# Patient Record
Sex: MALE | Race: WHITE | URBAN - METROPOLITAN AREA
[De-identification: names, ages, dates, MRNs, and addresses within clinical notes are randomized per-mention and may not be internally consistent; named-entity substitution may affect disease eponyms.]

---

## 2024-04-23 ENCOUNTER — OFFICE VISIT (OUTPATIENT)
Dept: PRIMARY CARE | Facility: CLINIC | Age: 39
End: 2024-04-23
Payer: COMMERCIAL

## 2024-04-23 VITALS
DIASTOLIC BLOOD PRESSURE: 77 MMHG | HEIGHT: 74 IN | TEMPERATURE: 98.3 F | HEART RATE: 69 BPM | SYSTOLIC BLOOD PRESSURE: 114 MMHG

## 2024-04-23 DIAGNOSIS — Z13.6 SCREENING FOR CARDIOVASCULAR CONDITION: ICD-10-CM

## 2024-04-23 DIAGNOSIS — F95.2 TOURETTE'S: ICD-10-CM

## 2024-04-23 DIAGNOSIS — Z76.89 ENCOUNTER TO ESTABLISH CARE: Primary | ICD-10-CM

## 2024-04-23 DIAGNOSIS — K21.9 CHRONIC GERD: ICD-10-CM

## 2024-04-23 DIAGNOSIS — Z13.21 ENCOUNTER FOR VITAMIN DEFICIENCY SCREENING: ICD-10-CM

## 2024-04-23 DIAGNOSIS — E78.2 MIXED HYPERLIPIDEMIA: ICD-10-CM

## 2024-04-23 DIAGNOSIS — R25.2 MUSCLE CRAMP: ICD-10-CM

## 2024-04-23 DIAGNOSIS — Z13.1 SCREENING FOR DIABETES MELLITUS: ICD-10-CM

## 2024-04-23 PROBLEM — E78.5 HYPERLIPIDEMIA: Status: ACTIVE | Noted: 2024-04-23

## 2024-04-23 PROCEDURE — 1036F TOBACCO NON-USER: CPT | Performed by: INTERNAL MEDICINE

## 2024-04-23 PROCEDURE — 99214 OFFICE O/P EST MOD 30 MIN: CPT | Performed by: INTERNAL MEDICINE

## 2024-04-23 RX ORDER — PANTOPRAZOLE SODIUM 40 MG/1
40 TABLET, DELAYED RELEASE ORAL
Qty: 90 TABLET | Refills: 1 | Status: SHIPPED | OUTPATIENT
Start: 2024-04-23 | End: 2024-10-20

## 2024-04-23 RX ORDER — LAMOTRIGINE 150 MG/1
1 TABLET ORAL 2 TIMES DAILY
COMMUNITY
Start: 2024-04-21

## 2024-04-23 ASSESSMENT — PAIN SCALES - GENERAL: PAINLEVEL: 4

## 2024-04-23 ASSESSMENT — ENCOUNTER SYMPTOMS
POLYDIPSIA: 0
FEVER: 0
COUGH: 0
PALPITATIONS: 0
CHILLS: 0
SHORTNESS OF BREATH: 0
NECK PAIN: 1
BACK PAIN: 1

## 2024-04-23 NOTE — PATIENT INSTRUCTIONS
Do not start the Pantoprazole for the reflux and stomach until you complete the H pylori breath test.

## 2024-04-23 NOTE — PROGRESS NOTES
Subjective   Patient ID: Ruben Chase is a 38 y.o. male who presents for Establish Care, Neck Pain, and Back Pain.    38-year-old male presents today to [facility] for care.  He has not been working with primary care doctor in a number of years but he does have a psychiatrist he follows with for the diagnosis of Tourette's syndrome.  He has primarily motor tics involving his eyes and throat clearing which have been reasonably well-controlled through Lamictal but he will still have breakthroughs especially under stress.  The patient is interested in establishing with a new psychiatrist because he is wants to consider if there is other medication or treatment options available to him and also his current psychiatrist is winding down their practice and he will eventually need a new one no matter what.    He has a history of chronic uncontrolled GERD for multiple years frequent symptoms he notices a strong relationship between certain types of foods that he eats although it is wide across the spectrum.  He claims that foods containing gluten will provoke his reflux and acid indigestion as well pepperoni spicy foods possibly coffee.  He has a regular occurrence she reports daily acid reflux.  He is using Tums and Pepcid consistently to manage the symptoms and even the combination of those is not consistently successful.  He has never been previously seen by a gastroenterologist, had an endoscopy, or been screened or tested for H. pylori.  He denies nausea vomiting or abdominal pain associated with the symptoms.  He has not been losing weight unintentionally.  He has no known family history of gastro intestinal cancers.    He has a healthy weight, healthy blood pressure, exercises regularly both with martial arts and [employer].        Neck Pain   Pertinent negatives include no chest pain or fever.   Back Pain  Pertinent negatives include no chest pain or fever.        Review of Systems   Constitutional:  Negative for  "chills and fever.   Respiratory:  Negative for cough and shortness of breath.    Cardiovascular:  Negative for chest pain and palpitations.   Endocrine: Negative for polydipsia and polyuria.   Musculoskeletal:  Positive for back pain and neck pain.       Objective   /77 (BP Location: Right arm, Patient Position: Sitting, BP Cuff Size: Large adult)   Pulse 69   Temp 36.8 °C (98.3 °F) (Temporal)   Ht 1.88 m (6' 2\")     Physical Exam  Constitutional:       Appearance: Normal appearance.   HENT:      Head: Normocephalic and atraumatic.      Right Ear: Tympanic membrane normal.      Left Ear: Tympanic membrane normal.      Nose: Nose normal.      Mouth/Throat:      Mouth: Mucous membranes are moist.   Eyes:      Extraocular Movements: Extraocular movements intact.      Conjunctiva/sclera: Conjunctivae normal.      Pupils: Pupils are equal, round, and reactive to light.   Neck:      Thyroid: No thyroid mass, thyromegaly or thyroid tenderness.      Vascular: No carotid bruit.   Cardiovascular:      Rate and Rhythm: Normal rate and regular rhythm.      Heart sounds: No murmur heard.     No friction rub. No gallop.   Pulmonary:      Effort: Pulmonary effort is normal. No respiratory distress.      Breath sounds: No wheezing, rhonchi or rales.   Abdominal:      General: Bowel sounds are normal.      Palpations: Abdomen is soft.      Tenderness: There is no abdominal tenderness. There is no guarding.      Hernia: No hernia is present.   Musculoskeletal:      Cervical back: Neck supple. No tenderness.      Right lower leg: No edema.      Left lower leg: No edema.   Lymphadenopathy:      Cervical: No cervical adenopathy.   Skin:     Coloration: Skin is not jaundiced.   Neurological:      General: No focal deficit present.      Mental Status: He is alert and oriented to person, place, and time.   Psychiatric:         Mood and Affect: Mood normal.         Assessment/Plan   Problem List Items Addressed This Visit           "   ICD-10-CM    Hyperlipidemia E78.5    Relevant Orders    Lipid Panel    CBC    Comprehensive Metabolic Panel    Hemoglobin A1C    Magnesium    Vitamin D 25-Hydroxy,Total (for eval of Vitamin D levels)    Referral to Gastroenterology    Tourette's F95.2    Relevant Orders    Referral to Psychiatry    Chronic GERD K21.9    Relevant Medications    pantoprazole (ProtoNix) 40 mg EC tablet    Other Relevant Orders    H. Pylori Breath Test     Other Visit Diagnoses         Codes    Encounter to [facility] care    -  Primary Z76.89    Screening for diabetes mellitus     Z13.1    Relevant Orders    Lipid Panel    CBC    Comprehensive Metabolic Panel    Hemoglobin A1C    Magnesium    Vitamin D 25-Hydroxy,Total (for eval of Vitamin D levels)    Referral to Gastroenterology    Screening for cardiovascular condition     Z13.6    Relevant Orders    Lipid Panel    CBC    Comprehensive Metabolic Panel    Hemoglobin A1C    Magnesium    Vitamin D 25-Hydroxy,Total (for eval of Vitamin D levels)    Referral to Gastroenterology    Muscle cramp     R25.2    Relevant Orders    Lipid Panel    CBC    Comprehensive Metabolic Panel    Hemoglobin A1C    Magnesium    Vitamin D 25-Hydroxy,Total (for eval of Vitamin D levels)    Referral to Gastroenterology    Encounter for vitamin deficiency screening     Z13.21    Relevant Orders    Lipid Panel    CBC    Comprehensive Metabolic Panel    Hemoglobin A1C    Magnesium    Vitamin D 25-Hydroxy,Total (for eval of Vitamin D levels)    Referral to Gastroenterology

## 2024-05-04 ENCOUNTER — LAB (OUTPATIENT)
Dept: LAB | Facility: LAB | Age: 39
End: 2024-05-04
Payer: COMMERCIAL

## 2024-05-04 DIAGNOSIS — Z13.1 SCREENING FOR DIABETES MELLITUS: ICD-10-CM

## 2024-05-04 DIAGNOSIS — Z13.21 ENCOUNTER FOR VITAMIN DEFICIENCY SCREENING: ICD-10-CM

## 2024-05-04 DIAGNOSIS — K21.9 CHRONIC GERD: ICD-10-CM

## 2024-05-04 DIAGNOSIS — E78.2 MIXED HYPERLIPIDEMIA: ICD-10-CM

## 2024-05-04 DIAGNOSIS — R25.2 MUSCLE CRAMP: ICD-10-CM

## 2024-05-04 DIAGNOSIS — Z13.6 SCREENING FOR CARDIOVASCULAR CONDITION: ICD-10-CM

## 2024-05-04 LAB
25(OH)D3 SERPL-MCNC: 37 NG/ML (ref 30–100)
ALBUMIN SERPL BCP-MCNC: 5 G/DL (ref 3.4–5)
ALP SERPL-CCNC: 61 U/L (ref 33–120)
ALT SERPL W P-5'-P-CCNC: 13 U/L (ref 10–52)
ANION GAP SERPL CALC-SCNC: 14 MMOL/L (ref 10–20)
AST SERPL W P-5'-P-CCNC: 16 U/L (ref 9–39)
BILIRUB SERPL-MCNC: 0.9 MG/DL (ref 0–1.2)
BUN SERPL-MCNC: 12 MG/DL (ref 6–23)
CALCIUM SERPL-MCNC: 9.7 MG/DL (ref 8.6–10.6)
CHLORIDE SERPL-SCNC: 104 MMOL/L (ref 98–107)
CHOLEST SERPL-MCNC: 182 MG/DL (ref 0–199)
CHOLESTEROL/HDL RATIO: 3.3
CO2 SERPL-SCNC: 25 MMOL/L (ref 21–32)
CREAT SERPL-MCNC: 1.04 MG/DL (ref 0.5–1.3)
EGFRCR SERPLBLD CKD-EPI 2021: >90 ML/MIN/1.73M*2
ERYTHROCYTE [DISTWIDTH] IN BLOOD BY AUTOMATED COUNT: 11.9 % (ref 11.5–14.5)
EST. AVERAGE GLUCOSE BLD GHB EST-MCNC: 88 MG/DL
GLUCOSE SERPL-MCNC: 84 MG/DL (ref 74–99)
HBA1C MFR BLD: 4.7 %
HCT VFR BLD AUTO: 45.3 % (ref 41–52)
HDLC SERPL-MCNC: 55.2 MG/DL
HGB BLD-MCNC: 16.2 G/DL (ref 13.5–17.5)
LDLC SERPL CALC-MCNC: 114 MG/DL
MAGNESIUM SERPL-MCNC: 2.24 MG/DL (ref 1.6–2.4)
MCH RBC QN AUTO: 30.6 PG (ref 26–34)
MCHC RBC AUTO-ENTMCNC: 35.8 G/DL (ref 32–36)
MCV RBC AUTO: 86 FL (ref 80–100)
NON HDL CHOLESTEROL: 127 MG/DL (ref 0–149)
NRBC BLD-RTO: 0 /100 WBCS (ref 0–0)
PLATELET # BLD AUTO: 273 X10*3/UL (ref 150–450)
POTASSIUM SERPL-SCNC: 4.1 MMOL/L (ref 3.5–5.3)
PROT SERPL-MCNC: 7.4 G/DL (ref 6.4–8.2)
RBC # BLD AUTO: 5.29 X10*6/UL (ref 4.5–5.9)
SODIUM SERPL-SCNC: 139 MMOL/L (ref 136–145)
TRIGL SERPL-MCNC: 65 MG/DL (ref 0–149)
VLDL: 13 MG/DL (ref 0–40)
WBC # BLD AUTO: 4.1 X10*3/UL (ref 4.4–11.3)

## 2024-05-04 PROCEDURE — 85027 COMPLETE CBC AUTOMATED: CPT

## 2024-05-04 PROCEDURE — 80061 LIPID PANEL: CPT

## 2024-05-04 PROCEDURE — 83036 HEMOGLOBIN GLYCOSYLATED A1C: CPT

## 2024-05-04 PROCEDURE — 82306 VITAMIN D 25 HYDROXY: CPT

## 2024-05-04 PROCEDURE — 80053 COMPREHEN METABOLIC PANEL: CPT

## 2024-05-04 PROCEDURE — 83735 ASSAY OF MAGNESIUM: CPT

## 2024-05-04 PROCEDURE — 83013 H PYLORI (C-13) BREATH: CPT

## 2024-05-04 PROCEDURE — 36415 COLL VENOUS BLD VENIPUNCTURE: CPT

## 2024-05-05 LAB — UREA BREATH TEST QL: NEGATIVE

## 2024-07-03 ENCOUNTER — OFFICE VISIT (OUTPATIENT)
Dept: GASTROENTEROLOGY | Facility: CLINIC | Age: 39
End: 2024-07-03
Payer: COMMERCIAL

## 2024-07-03 VITALS
SYSTOLIC BLOOD PRESSURE: 110 MMHG | BODY MASS INDEX: 26.05 KG/M2 | TEMPERATURE: 97.6 F | HEART RATE: 53 BPM | HEIGHT: 74 IN | WEIGHT: 203 LBS | DIASTOLIC BLOOD PRESSURE: 66 MMHG

## 2024-07-03 DIAGNOSIS — Z13.21 ENCOUNTER FOR VITAMIN DEFICIENCY SCREENING: ICD-10-CM

## 2024-07-03 DIAGNOSIS — K21.00 GASTROESOPHAGEAL REFLUX DISEASE WITH ESOPHAGITIS WITHOUT HEMORRHAGE: Primary | ICD-10-CM

## 2024-07-03 PROCEDURE — 99214 OFFICE O/P EST MOD 30 MIN: CPT | Performed by: INTERNAL MEDICINE

## 2024-07-03 PROCEDURE — 99204 OFFICE O/P NEW MOD 45 MIN: CPT | Performed by: INTERNAL MEDICINE

## 2024-07-03 NOTE — PROGRESS NOTES
History Of Present Illness  Ruben Chase is a 38 y.o. male presenting with GERD.    Per the patient he has had problems with heartburn and regurgitation since his teens but has worsened over the past couple of years.  He has never taken prescription medication and most recently has used both Tums and over-the-counter Pepcid with relief.  He has noted multiple foods that trigger his symptoms including fatty foods, acidic foods, gluten and dairy products.  In addition alcohol especially beer and coffee worsen his symptoms.  Symptoms typically occur either immediately or shortly after eating and are worse with larger meals or if he drinks fluids with his meals.  He has no major nighttime symptoms and denies dysphagia, anorexia, weight loss or early satiety.  There is no family history of digestive problems and he has not had any previous GI evaluation.  He was given a prescription for pantoprazole but has not filled it as he does not want to take that potent a medication on a regular basis.    He reports having blood work and breath testing by a naturopathic few years ago but does not have a copy of the results.    Past Medical History  Past Medical History:   Diagnosis Date    Spinal meningioma (Multi)     3 years old       Surgical History  Past Surgical History:   Procedure Laterality Date    BREAST LUMPECTOMY      OTHER SURGICAL HISTORY  11/09/2015    Arm Incision        Social History  He reports that he has quit smoking. His smoking use included cigarettes. He has quit using smokeless tobacco. He reports current alcohol use. He reports current drug use. Drug: Marijuana.    Family History  Family History   Problem Relation Name Age of Onset    Colon cancer Neg Hx      Prostate cancer Neg Hx          Allergies  Penicillins    Last Recorded Vitals  There were no vitals taken for this visit.       Physical Exam  Vitals reviewed.   Constitutional:       Appearance: Normal appearance.   Cardiovascular:      Rate and  Rhythm: Normal rate and regular rhythm.   Pulmonary:      Effort: Pulmonary effort is normal.      Breath sounds: Normal breath sounds.   Abdominal:      General: Bowel sounds are normal.      Palpations: Abdomen is soft. There is no mass.      Tenderness: There is no abdominal tenderness.   Musculoskeletal:      Cervical back: Normal range of motion and neck supple.   Neurological:      Mental Status: He is alert.           Labs  Lab Results   Component Value Date    GLUCOSE 84 05/04/2024    CALCIUM 9.7 05/04/2024     05/04/2024    K 4.1 05/04/2024    CO2 25 05/04/2024     05/04/2024    BUN 12 05/04/2024    CREATININE 1.04 05/04/2024     Lab Results   Component Value Date    WBC 4.1 (L) 05/04/2024    HGB 16.2 05/04/2024    HCT 45.3 05/04/2024    MCV 86 05/04/2024     05/04/2024     05/04/2024    K 4.1 05/04/2024     05/04/2024    CO2 25 05/04/2024    BUN 12 05/04/2024    CREATININE 1.04 05/04/2024    CALCIUM 9.7 05/04/2024    PROT 7.4 05/04/2024    BILITOT 0.9 05/04/2024    ALKPHOS 61 05/04/2024    ALT 13 05/04/2024    AST 16 05/04/2024    GLUCOSE 84 05/04/2024           Imaging          ASSESSMENT & PLAN  Problem List Items Addressed This Visit             ICD-10-CM    Hyperlipidemia E78.5     Other Visit Diagnoses         Codes    Screening for diabetes mellitus     Z13.1    Screening for cardiovascular condition     Z13.6    Muscle cramp     R25.2    Encounter for vitamin deficiency screening     Z13.21          He has fairly typical GERD symptoms without any red flags though the chronicity of his symptoms would put him at somewhat increased risk for Mckeon's esophagus.  At this point however he would not be inclined to pursue formal evaluation.  We reviewed multiple lifestyle measures and he was given literature on GERD management.  He could take over-the-counter Gaviscon and Pepcid as needed.  He will follow-up in 6 months time.      I spent 30 minutes in the professional and  overall care of this patient.      Canelo Aaron MD

## 2024-07-03 NOTE — PATIENT INSTRUCTIONS
It was nice to meet you. You have fairly typical acid reflux and some general guidelines:  Avoid overeating and drinking a lot with meals  Before exercise eat lighter and avoid fat and high fiber foods  Avoid any foods that seem to rrigger the symptoms  Use Gaviscon for short term relief and can take Pepcid as needed    Consider upper endoscopy if symptoms worsen or develop issues with food getting stuck    Follow up in 6 months to reassess

## 2024-07-11 DIAGNOSIS — F95.2 TOURETTE'S: Primary | ICD-10-CM

## 2024-07-11 RX ORDER — LAMOTRIGINE 150 MG/1
150 TABLET ORAL 2 TIMES DAILY
Qty: 180 TABLET | Refills: 0 | Status: SHIPPED | OUTPATIENT
Start: 2024-07-11

## 2024-08-02 ENCOUNTER — APPOINTMENT (OUTPATIENT)
Dept: PRIMARY CARE | Facility: CLINIC | Age: 39
End: 2024-08-02
Payer: COMMERCIAL

## 2024-08-07 ENCOUNTER — APPOINTMENT (OUTPATIENT)
Dept: GASTROENTEROLOGY | Facility: CLINIC | Age: 39
End: 2024-08-07
Payer: COMMERCIAL

## 2024-08-26 ENCOUNTER — APPOINTMENT (OUTPATIENT)
Dept: PRIMARY CARE | Facility: CLINIC | Age: 39
End: 2024-08-26
Payer: COMMERCIAL

## 2024-08-26 DIAGNOSIS — F95.2 TOURETTE'S: Primary | ICD-10-CM

## 2024-08-26 PROCEDURE — 1036F TOBACCO NON-USER: CPT | Performed by: INTERNAL MEDICINE

## 2024-08-26 PROCEDURE — 99213 OFFICE O/P EST LOW 20 MIN: CPT | Performed by: INTERNAL MEDICINE

## 2024-08-26 RX ORDER — TIZANIDINE 2 MG/1
2 TABLET ORAL EVERY 6 HOURS PRN
Qty: 90 TABLET | Refills: 0 | Status: SHIPPED | OUTPATIENT
Start: 2024-08-26

## 2024-08-26 ASSESSMENT — ENCOUNTER SYMPTOMS
SHORTNESS OF BREATH: 0
COUGH: 0
POLYDIPSIA: 0
FEVER: 0
CHILLS: 0
PALPITATIONS: 0

## 2024-08-26 NOTE — PROGRESS NOTES
Subjective   Patient ID: Ruben Chase is a 38 y.o. male who presents for No chief complaint on file..    38-year-old male presents today by video based telemedicine encounter and is currently located in the state of [place] at his stated employment location and I am located at my home in [place].  Today's medical visit is to discuss the purposes of managing his tic disorder Tourette's syndrome.  Patient consents to a visit completed in this manner.  Patient has a history of Tourette syndrome diagnosed as a youth has been on multiple medications historically including antipsychotics and clonidine that he can recall specifically.  He has not been on any preventative take medication for some time.  At this time he is here to discuss the use of muscle relaxers to treat the complications that result from the tics but not to suppress tics themselves specifically.  He is not interested in pursuing take suppressive therapy at this time given his previous experiences.  He was advised about there being other opportunities for tick suppressive therapy including other medications available by prescription or following up with a specialist that or even Botox therapy if necessary.  At this time the patient's primary goal is to treat the muscle fatigue and aching that can result of repetitive tics in his situation the take is I raising eyebrow rising and sometimes tension and stiffness with the shoulders.  His own independent research is found that other people with take related complications of muscle symptoms find benefits for muscle relaxers and he would like to consider using a muscle relaxer at this time.  He understands and was educated specifically there is no evidence to support that muscle relaxers or take suppressive medications and would only potentially assist with the complications thereof.  Understanding of this the patient still preferred to move forward with this type of and at this time and was educated thoroughly  regarding the risks and benefits to muscle relaxer use.  Ultimately a muscle relaxer was sent to his preferred pharmacy on file with instructions on for safe and appropriate use.  He will update me in 1 to 2 weeks via [employer] about the benefits of the medication at the dose prescribed adjustments of her medication no changes would be available to the patient if necessary.         Review of Systems   Constitutional:  Negative for chills and fever.   Respiratory:  Negative for cough and shortness of breath.    Cardiovascular:  Negative for chest pain and palpitations.   Endocrine: Negative for polydipsia and polyuria.       Objective   There were no vitals taken for this visit.    Physical Exam    Assessment/Plan   Problem List Items Addressed This Visit    None

## 2024-09-04 DIAGNOSIS — R25.2 MUSCLE CRAMP: Primary | ICD-10-CM

## 2024-09-04 DIAGNOSIS — F95.2 TOURETTE'S: ICD-10-CM

## 2024-09-04 RX ORDER — METHOCARBAMOL 500 MG/1
500 TABLET, FILM COATED ORAL EVERY 8 HOURS PRN
Qty: 60 TABLET | Refills: 0 | Status: SHIPPED | OUTPATIENT
Start: 2024-09-04

## 2024-09-15 DIAGNOSIS — F95.2 TOURETTE'S: ICD-10-CM

## 2024-09-16 RX ORDER — TIZANIDINE 2 MG/1
2 TABLET ORAL EVERY 6 HOURS PRN
Qty: 90 TABLET | Refills: 0 | Status: SHIPPED | OUTPATIENT
Start: 2024-09-16

## 2024-11-04 DIAGNOSIS — F95.2 TOURETTE'S: ICD-10-CM

## 2024-11-05 RX ORDER — LAMOTRIGINE 150 MG/1
TABLET ORAL 2 TIMES DAILY
Qty: 180 TABLET | Refills: 1 | Status: SHIPPED | OUTPATIENT
Start: 2024-11-05

## 2024-11-18 DIAGNOSIS — F95.2 TOURETTE'S: ICD-10-CM

## 2024-11-18 RX ORDER — LAMOTRIGINE 150 MG/1
150 TABLET ORAL 2 TIMES DAILY
Qty: 180 TABLET | Refills: 2 | Status: SHIPPED | OUTPATIENT
Start: 2024-11-18

## 2025-07-03 ENCOUNTER — APPOINTMENT (OUTPATIENT)
Dept: PRIMARY CARE | Facility: CLINIC | Age: 40
End: 2025-07-03
Payer: COMMERCIAL

## 2025-07-03 VITALS
HEART RATE: 59 BPM | BODY MASS INDEX: 24.26 KG/M2 | SYSTOLIC BLOOD PRESSURE: 118 MMHG | WEIGHT: 189 LBS | DIASTOLIC BLOOD PRESSURE: 67 MMHG | TEMPERATURE: 96.2 F | HEIGHT: 74 IN

## 2025-07-03 DIAGNOSIS — Z13.1 SCREENING FOR DIABETES MELLITUS: ICD-10-CM

## 2025-07-03 DIAGNOSIS — R07.81 RIB PAIN ON LEFT SIDE: ICD-10-CM

## 2025-07-03 DIAGNOSIS — E78.2 MIXED HYPERLIPIDEMIA: ICD-10-CM

## 2025-07-03 DIAGNOSIS — Z00.00 ANNUAL PHYSICAL EXAM: Primary | ICD-10-CM

## 2025-07-03 DIAGNOSIS — E29.1 HYPOGONADISM IN MALE: ICD-10-CM

## 2025-07-03 DIAGNOSIS — R63.4 WEIGHT LOSS: ICD-10-CM

## 2025-07-03 DIAGNOSIS — M35.7 BENIGN JOINT HYPERMOBILITY: ICD-10-CM

## 2025-07-03 DIAGNOSIS — Z13.29 SCREENING FOR THYROID DISORDER: ICD-10-CM

## 2025-07-03 DIAGNOSIS — Z13.6 SCREENING FOR CARDIOVASCULAR CONDITION: ICD-10-CM

## 2025-07-03 PROCEDURE — 99395 PREV VISIT EST AGE 18-39: CPT | Performed by: INTERNAL MEDICINE

## 2025-07-03 PROCEDURE — 1036F TOBACCO NON-USER: CPT | Performed by: INTERNAL MEDICINE

## 2025-07-03 PROCEDURE — 3008F BODY MASS INDEX DOCD: CPT | Performed by: INTERNAL MEDICINE

## 2025-07-03 ASSESSMENT — ENCOUNTER SYMPTOMS
CHILLS: 0
COUGH: 0
POLYDIPSIA: 0
PALPITATIONS: 0
SHORTNESS OF BREATH: 0
FEVER: 0

## 2025-07-03 ASSESSMENT — PAIN SCALES - GENERAL: PAINLEVEL_OUTOF10: 0-NO PAIN

## 2025-07-03 NOTE — PROGRESS NOTES
"Subjective   Patient ID: Ruben Chase is a 39 y.o. male who presents for Annual Exam.    Patient presents today for an annual wellness exam    Medical history and surgical history updated today  Medication list reconciled and updated  Patient denies vision issues at this time  Patient denies hearing issues at this time  Current diet: Gluten free diet. No dairy. Limits processed foods. No fast food.   Current exercise habit: regularly, martial arts training and biking regularly.   Follows with a dentist: Yes    Patient counseled about available preventative health vaccinations and provided with opportunity to update them with our office or through prescription to preferred pharmacy.    Reviewed and discussed preventative health screening recommendations for colon cancer: Age 45    Reviewed and discussed preventative health recommendations for screening for prostate cancer: Age 50    Hypermobility of joints, eversion of lower ribs palpated. Possible Cooper' Danlos or connective tissue disorder. Advise Rheum eval.          Review of Systems   Constitutional:  Negative for chills and fever.   Respiratory:  Negative for cough and shortness of breath.    Cardiovascular:  Negative for chest pain and palpitations.   Endocrine: Negative for polydipsia and polyuria.       Objective   /67 (BP Location: Right arm, Patient Position: Sitting, BP Cuff Size: Adult)   Pulse 59   Temp 35.7 °C (96.2 °F) (Temporal)   Ht 1.88 m (6' 2\")   Wt 85.7 kg (189 lb)   BMI 24.27 kg/m²     Physical Exam  Constitutional:       Appearance: Normal appearance.   HENT:      Head: Normocephalic and atraumatic.      Right Ear: Tympanic membrane normal.      Left Ear: Tympanic membrane normal.      Nose: Nose normal.      Mouth/Throat:      Mouth: Mucous membranes are moist.   Eyes:      Extraocular Movements: Extraocular movements intact.      Conjunctiva/sclera: Conjunctivae normal.      Pupils: Pupils are equal, round, and reactive to " light.   Neck:      Thyroid: No thyroid mass, thyromegaly or thyroid tenderness.      Vascular: No carotid bruit.   Cardiovascular:      Rate and Rhythm: Normal rate and regular rhythm.      Heart sounds: No murmur heard.     No friction rub. No gallop.   Pulmonary:      Effort: Pulmonary effort is normal. No respiratory distress.      Breath sounds: No wheezing, rhonchi or rales.   Abdominal:      General: Bowel sounds are normal.      Palpations: Abdomen is soft.      Tenderness: There is no abdominal tenderness. There is no guarding.      Hernia: No hernia is present.   Musculoskeletal:      Cervical back: Neck supple. No tenderness.      Right lower leg: No edema.      Left lower leg: No edema.   Lymphadenopathy:      Cervical: No cervical adenopathy.   Skin:     Coloration: Skin is not jaundiced.   Neurological:      General: No focal deficit present.      Mental Status: He is alert and oriented to person, place, and time.      Cranial Nerves: No cranial nerve deficit.      Sensory: No sensory deficit.      Motor: No weakness.      Coordination: Coordination normal.      Gait: Gait normal.      Deep Tendon Reflexes: Reflexes normal.   Psychiatric:         Mood and Affect: Mood normal.         Assessment/Plan   Assessment & Plan  Annual physical exam    Orders:    Lipid Panel; Future    CBC and Auto Differential; Future    Comprehensive Metabolic Panel; Future    Hemoglobin A1C; Future    TSH with reflex to Free T4 if abnormal; Future    Testosterone; Future    Mixed hyperlipidemia    Orders:    Lipid Panel; Future    CBC and Auto Differential; Future    Comprehensive Metabolic Panel; Future    Hemoglobin A1C; Future    TSH with reflex to Free T4 if abnormal; Future    Testosterone; Future    Screening for diabetes mellitus    Orders:    Lipid Panel; Future    CBC and Auto Differential; Future    Comprehensive Metabolic Panel; Future    Hemoglobin A1C; Future    TSH with reflex to Free T4 if abnormal; Future     Testosterone; Future    Screening for cardiovascular condition    Orders:    Lipid Panel; Future    CBC and Auto Differential; Future    Comprehensive Metabolic Panel; Future    Hemoglobin A1C; Future    TSH with reflex to Free T4 if abnormal; Future    Testosterone; Future    Hypogonadism in male    Orders:    Lipid Panel; Future    CBC and Auto Differential; Future    Comprehensive Metabolic Panel; Future    Hemoglobin A1C; Future    TSH with reflex to Free T4 if abnormal; Future    Testosterone; Future    Weight loss    Orders:    Lipid Panel; Future    CBC and Auto Differential; Future    Comprehensive Metabolic Panel; Future    Hemoglobin A1C; Future    TSH with reflex to Free T4 if abnormal; Future    Testosterone; Future    Screening for thyroid disorder    Orders:    Lipid Panel; Future    CBC and Auto Differential; Future    Comprehensive Metabolic Panel; Future    Hemoglobin A1C; Future    TSH with reflex to Free T4 if abnormal; Future    Testosterone; Future    Benign joint hypermobility    Orders:    Referral to Rheumatology; Future    XR ribs left 2 views; Future    Rib pain on left side    Orders:    XR ribs left 2 views; Future

## 2025-07-03 NOTE — ASSESSMENT & PLAN NOTE
Orders:    Lipid Panel; Future    CBC and Auto Differential; Future    Comprehensive Metabolic Panel; Future    Hemoglobin A1C; Future    TSH with reflex to Free T4 if abnormal; Future    Testosterone; Future

## 2025-08-29 DIAGNOSIS — F95.2 TOURETTE'S: ICD-10-CM

## 2025-08-29 DIAGNOSIS — R25.2 MUSCLE CRAMP: ICD-10-CM

## 2025-08-29 RX ORDER — METHOCARBAMOL 500 MG/1
500 TABLET, FILM COATED ORAL EVERY 8 HOURS PRN
Qty: 60 TABLET | Refills: 0 | Status: SHIPPED | OUTPATIENT
Start: 2025-08-29